# Patient Record
Sex: FEMALE | Race: BLACK OR AFRICAN AMERICAN | URBAN - METROPOLITAN AREA
[De-identification: names, ages, dates, MRNs, and addresses within clinical notes are randomized per-mention and may not be internally consistent; named-entity substitution may affect disease eponyms.]

---

## 2023-11-10 ENCOUNTER — HOSPITAL ENCOUNTER (EMERGENCY)
Age: 28
Discharge: HOME | End: 2023-11-10
Attending: EMERGENCY MEDICINE
Payer: MEDICAID

## 2023-11-10 VITALS
OXYGEN SATURATION: 100 % | HEART RATE: 94 BPM | HEIGHT: 62 IN | RESPIRATION RATE: 16 BRPM | DIASTOLIC BLOOD PRESSURE: 81 MMHG | SYSTOLIC BLOOD PRESSURE: 116 MMHG | BODY MASS INDEX: 22.31 KG/M2 | TEMPERATURE: 98.1 F | WEIGHT: 121.25 LBS

## 2023-11-10 DIAGNOSIS — R23.2 HOT FLASHES: Primary | ICD-10-CM

## 2023-11-10 DIAGNOSIS — D50.9 MICROCYTIC ANEMIA: ICD-10-CM

## 2023-11-10 LAB
BASOPHILS # BLD: 0 K/UL (ref 0–0.2)
BASOPHILS NFR BLD: 0.4 %
BILIRUB UR QL STRIP.AUTO: NEGATIVE
CLARITY UR: CLEAR
COLOR UR: YELLOW
DEPRECATED RDW RBC AUTO: 18.4 % (ref 12.4–15.4)
EOSINOPHIL # BLD: 0.1 K/UL (ref 0–0.6)
EOSINOPHIL NFR BLD: 1.3 %
GLUCOSE UR STRIP.AUTO-MCNC: NEGATIVE MG/DL
HCG UR QL: NEGATIVE
HCT VFR BLD AUTO: 27.9 % (ref 36–48)
HGB BLD-MCNC: 9.2 G/DL (ref 12–16)
HGB UR QL STRIP.AUTO: NEGATIVE
KETONES UR STRIP.AUTO-MCNC: NEGATIVE MG/DL
LEUKOCYTE ESTERASE UR QL STRIP.AUTO: NEGATIVE
LYMPHOCYTES # BLD: 3.4 K/UL (ref 1–5.1)
LYMPHOCYTES NFR BLD: 56 %
MCH RBC QN AUTO: 24.2 PG (ref 26–34)
MCHC RBC AUTO-ENTMCNC: 33.1 G/DL (ref 31–36)
MCV RBC AUTO: 73.1 FL (ref 80–100)
MONOCYTES # BLD: 0.4 K/UL (ref 0–1.3)
MONOCYTES NFR BLD: 7.3 %
NEUTROPHILS # BLD: 2.1 K/UL (ref 1.7–7.7)
NEUTROPHILS NFR BLD: 35 %
NITRITE UR QL STRIP.AUTO: NEGATIVE
PH UR STRIP.AUTO: 6 [PH] (ref 5–8)
PLATELET # BLD AUTO: 383 K/UL (ref 135–450)
PMV BLD AUTO: 6.9 FL (ref 5–10.5)
PROT UR STRIP.AUTO-MCNC: NEGATIVE MG/DL
RBC # BLD AUTO: 3.82 M/UL (ref 4–5.2)
SP GR UR STRIP.AUTO: >=1.03 (ref 1–1.03)
T4 FREE SERPL-MCNC: 0.8 NG/DL (ref 0.9–1.8)
TSH SERPL DL<=0.005 MIU/L-ACNC: 6.94 UIU/ML (ref 0.27–4.2)
UA DIPSTICK W REFLEX MICRO PNL UR: NORMAL
URN SPEC COLLECT METH UR: NORMAL
UROBILINOGEN UR STRIP-ACNC: 0.2 E.U./DL
WBC # BLD AUTO: 6.1 K/UL (ref 4–11)

## 2023-11-10 PROCEDURE — 84703 CHORIONIC GONADOTROPIN ASSAY: CPT

## 2023-11-10 PROCEDURE — 99283 EMERGENCY DEPT VISIT LOW MDM: CPT

## 2023-11-10 PROCEDURE — 36415 COLL VENOUS BLD VENIPUNCTURE: CPT

## 2023-11-10 PROCEDURE — 85025 COMPLETE CBC W/AUTO DIFF WBC: CPT

## 2023-11-10 PROCEDURE — 81003 URINALYSIS AUTO W/O SCOPE: CPT

## 2023-11-10 PROCEDURE — 84443 ASSAY THYROID STIM HORMONE: CPT

## 2023-11-10 PROCEDURE — 84439 ASSAY OF FREE THYROXINE: CPT

## 2023-11-10 RX ORDER — PNV NO.95/FERROUS FUM/FOLIC AC 28MG-0.8MG
1 TABLET ORAL DAILY
Qty: 90 TABLET | Refills: 2 | Status: SHIPPED | OUTPATIENT
Start: 2023-11-10 | End: 2024-02-08

## 2023-11-10 ASSESSMENT — LIFESTYLE VARIABLES
HOW OFTEN DO YOU HAVE A DRINK CONTAINING ALCOHOL: NEVER
HOW MANY STANDARD DRINKS CONTAINING ALCOHOL DO YOU HAVE ON A TYPICAL DAY: PATIENT DOES NOT DRINK

## 2023-11-10 ASSESSMENT — ENCOUNTER SYMPTOMS
ABDOMINAL PAIN: 0
DIARRHEA: 0
RHINORRHEA: 0
NAUSEA: 0
SHORTNESS OF BREATH: 0
SORE THROAT: 0
COUGH: 0

## 2023-11-10 ASSESSMENT — PAIN SCALES - GENERAL: PAINLEVEL_OUTOF10: 0

## 2023-11-10 NOTE — DISCHARGE INSTRUCTIONS
You have been provided with a printed prescription. You should continue a prenatal vitamin while you are breastfeeding. Watch for the results of your thyroid testing labs in the [facility] application or you can review these at your follow up appointment with the endocrinologist.    Be sure to contact the clinic listed in your paperwork to arrange for a follow up visit. If you have any new or worsening issues after going home don't hesitate to return here for reevaluation at any time 24/7!

## 2023-11-10 NOTE — ED NOTES
Discharge and education instructions reviewed. Patient verbalized understanding, teach-back successful. Patient denied questions at this time. No acute distress noted. Patient instructed to follow-up as noted - return to emergency department if symptoms worsen. Patient verbalized understanding. Discharged per EDMD with discharge instructions.         Ginger Weaver RN  11/10/23 [id number]

## 2023-12-05 ENCOUNTER — HOSPITAL ENCOUNTER (EMERGENCY)
Age: 28
Discharge: HOME | End: 2023-12-05
Payer: COMMERCIAL

## 2023-12-05 LAB
ALBUMIN SERPL-MCNC: 4.4 G/DL (ref 3.4–5)
ALBUMIN/GLOB SERPL: 1.3 {RATIO} (ref 1.1–2.2)
ALP SERPL-CCNC: 95 U/L (ref 40–129)
ALT SERPL-CCNC: 8 U/L (ref 10–40)
ANION GAP SERPL CALCULATED.3IONS-SCNC: 9 MMOL/L (ref 3–16)
AST SERPL-CCNC: 17 U/L (ref 15–37)
BASOPHILS # BLD: 0 K/UL (ref 0–0.2)
BASOPHILS NFR BLD: 0.4 %
BILIRUB SERPL-MCNC: 0.5 MG/DL (ref 0–1)
BILIRUB UR QL STRIP.AUTO: NEGATIVE
BUN SERPL-MCNC: 11 MG/DL (ref 7–20)
CALCIUM SERPL-MCNC: 9.8 MG/DL (ref 8.3–10.6)
CHLORIDE SERPL-SCNC: 102 MMOL/L (ref 99–110)
CLARITY UR: CLEAR
CO2 SERPL-SCNC: 29 MMOL/L (ref 21–32)
COLOR UR: YELLOW
CREAT SERPL-MCNC: <0.5 MG/DL (ref 0.6–1.1)
DEPRECATED RDW RBC AUTO: 19 % (ref 12.4–15.4)
EOSINOPHIL # BLD: 0.1 K/UL (ref 0–0.6)
EOSINOPHIL NFR BLD: 0.9 %
GFR SERPLBLD CREATININE-BSD FMLA CKD-EPI: >60 ML/MIN/{1.73_M2}
GLUCOSE SERPL-MCNC: 101 MG/DL (ref 70–99)
GLUCOSE UR STRIP.AUTO-MCNC: NEGATIVE MG/DL
HCG UR QL: NEGATIVE
HCT VFR BLD AUTO: 31.2 % (ref 36–48)
HGB BLD-MCNC: 10.2 G/DL (ref 12–16)
HGB UR QL STRIP.AUTO: NEGATIVE
KETONES UR STRIP.AUTO-MCNC: ABNORMAL MG/DL
LEUKOCYTE ESTERASE UR QL STRIP.AUTO: NEGATIVE
LYMPHOCYTES # BLD: 3.1 K/UL (ref 1–5.1)
LYMPHOCYTES NFR BLD: 45.8 %
MCH RBC QN AUTO: 24.2 PG (ref 26–34)
MCHC RBC AUTO-ENTMCNC: 32.7 G/DL (ref 31–36)
MCV RBC AUTO: 74.1 FL (ref 80–100)
MONOCYTES # BLD: 0.5 K/UL (ref 0–1.3)
MONOCYTES NFR BLD: 7.9 %
NEUTROPHILS # BLD: 3.1 K/UL (ref 1.7–7.7)
NEUTROPHILS NFR BLD: 45 %
NITRITE UR QL STRIP.AUTO: NEGATIVE
PH UR STRIP.AUTO: 6 [PH] (ref 5–8)
PLATELET # BLD AUTO: 403 K/UL (ref 135–450)
PMV BLD AUTO: 7.1 FL (ref 5–10.5)
POTASSIUM SERPL-SCNC: 3.4 MMOL/L (ref 3.5–5.1)
PROT SERPL-MCNC: 7.9 G/DL (ref 6.4–8.2)
PROT UR STRIP.AUTO-MCNC: NEGATIVE MG/DL
RBC # BLD AUTO: 4.2 M/UL (ref 4–5.2)
SODIUM SERPL-SCNC: 140 MMOL/L (ref 136–145)
SP GR UR STRIP.AUTO: >=1.03 (ref 1–1.03)
UA COMPLETE W REFLEX CULTURE PNL UR: ABNORMAL
UA DIPSTICK W REFLEX MICRO PNL UR: ABNORMAL
URN SPEC COLLECT METH UR: ABNORMAL
UROBILINOGEN UR STRIP-ACNC: 0.2 E.U./DL
WBC # BLD AUTO: 6.8 K/UL (ref 4–11)

## 2023-12-05 PROCEDURE — 84703 CHORIONIC GONADOTROPIN ASSAY: CPT

## 2023-12-05 PROCEDURE — 80053 COMPREHEN METABOLIC PANEL: CPT

## 2023-12-05 PROCEDURE — 81003 URINALYSIS AUTO W/O SCOPE: CPT

## 2023-12-05 PROCEDURE — 85025 COMPLETE CBC W/AUTO DIFF WBC: CPT

## 2023-12-05 PROCEDURE — 36415 COLL VENOUS BLD VENIPUNCTURE: CPT

## 2023-12-18 NOTE — ED PROVIDER NOTES
no vitals filed for this visit. Medications - No data to display    Discharge Medication List as of 12/5/2023  6:47 AM          SEP-1 CORE MEASURE DATA  Exclusion criteria: the patient is NOT to be included for sepsis due to:  SIRS criteria are not met    Patient remained stable in the ED. patient's laboratory work was unremarkable remarkable. Her potassium was 3.4. Glucose was 101. Liver functions were relatively normal.  Her ALT was mildly low otherwise it was normal.  Hemoglobin was 10.2 and hematocrit was 31.2 which is stable for this patient. Her urinalysis was negative. Therefore, patient was discharged to follow-up with her gynecologist for hormone testing and refer back to her family doctor for further evaluation and treatment. She was instructed return if any problems and continue her present care. See paper chart for specific discharge instructions. Diagnostic considerations include but are not limited to: Influenza, Other viral illness, Meningitis, Group A strep, Airway Obstruction, Pneumonia, Hypoxemia, Dehydration, COVID-19, other. CBC-CBC was ordered to rule out anemia, infection, abnormal platelet count, polycythemia, abnormal Red cell pathology, or any other pathology that might be causing the patient's symptoms    CMP-CMP was ordered to rule out electrolyte abnormalities, liver dysfunction, kidney dysfunction, electrolyte imbalance, abnormal transaminases, or any other pathology that might be causing the patient's symptoms. Urine-urinalysis was ordered to rule out infection, renal failure, dehydration, pregnancy, proteinuria, bilirubinuria, or any other pathology that might be causing the patient's symptoms    The patient's blood pressure was not found to be elevated according to CMS/Medicare and the Affordable Care Act/ObamaCare criteria. See discharge instructions for specific medications, discharge information, and treatments.  They were verbally instructed to return to

## 2024-01-21 ENCOUNTER — HOSPITAL ENCOUNTER (EMERGENCY)
Age: 29
Discharge: HOME | End: 2024-01-22
Attending: EMERGENCY MEDICINE
Payer: COMMERCIAL

## 2024-01-21 DIAGNOSIS — E03.9 HYPOTHYROIDISM, UNSPECIFIED TYPE: Primary | ICD-10-CM

## 2024-01-21 DIAGNOSIS — Z91.148 NON COMPLIANCE W MEDICATION REGIMEN: ICD-10-CM

## 2024-01-21 LAB
GLUCOSE BLD-MCNC: 116 MG/DL (ref 70–99)
PERFORMED ON: ABNORMAL

## 2024-01-21 PROCEDURE — 99283 EMERGENCY DEPT VISIT LOW MDM: CPT

## 2024-01-21 ASSESSMENT — PAIN SCALES - GENERAL: PAINLEVEL_OUTOF10: 0

## 2024-01-21 ASSESSMENT — PAIN - FUNCTIONAL ASSESSMENT: PAIN_FUNCTIONAL_ASSESSMENT: 0-10

## 2024-01-22 VITALS
WEIGHT: 125.66 LBS | HEART RATE: 92 BPM | OXYGEN SATURATION: 100 % | HEIGHT: 62 IN | SYSTOLIC BLOOD PRESSURE: 119 MMHG | BODY MASS INDEX: 23.12 KG/M2 | DIASTOLIC BLOOD PRESSURE: 59 MMHG | RESPIRATION RATE: 16 BRPM | TEMPERATURE: 97.4 F

## 2024-01-22 LAB
BILIRUB UR QL STRIP.AUTO: NEGATIVE
CLARITY UR: CLEAR
COLOR UR: YELLOW
GLUCOSE UR STRIP.AUTO-MCNC: NEGATIVE MG/DL
HCG UR QL: NEGATIVE
HGB UR QL STRIP.AUTO: NEGATIVE
KETONES UR STRIP.AUTO-MCNC: NEGATIVE MG/DL
LEUKOCYTE ESTERASE UR QL STRIP.AUTO: NEGATIVE
NITRITE UR QL STRIP.AUTO: NEGATIVE
PH UR STRIP.AUTO: 6 [PH] (ref 5–8)
PROT UR STRIP.AUTO-MCNC: NEGATIVE MG/DL
SP GR UR STRIP.AUTO: 1.01 (ref 1–1.03)
UA COMPLETE W REFLEX CULTURE PNL UR: NORMAL
UA DIPSTICK W REFLEX MICRO PNL UR: NORMAL
URN SPEC COLLECT METH UR: NORMAL
UROBILINOGEN UR STRIP-ACNC: 0.2 E.U./DL

## 2024-01-22 PROCEDURE — 81003 URINALYSIS AUTO W/O SCOPE: CPT

## 2024-01-22 PROCEDURE — 84703 CHORIONIC GONADOTROPIN ASSAY: CPT

## 2024-01-22 NOTE — ED TRIAGE NOTES
Patient to ED via EMS for trouble concentrating  that has been intermittent since having her thyroid removed.  Patient is alert and oriented with GCS 15.  Patient complains of \"brain fog\" that get worse when she has not slept well or becomes overstimulated.  Patient denies any dizziness, speech is normal, no slurring or trouble forming words.  Patient has steady gait when transferring from cot to ED bed.  Patient denies any other complaints at this time, vital signs stable.

## 2024-01-23 NOTE — ED PROVIDER NOTES
[facility]  EMERGENCY DEPARTMENT ENCOUNTER        Pt Name: Genesis Carter  MRN: [id number]  Birthdate 1995  Date of evaluation: 1/21/2024  Provider: Joaquin Albrecht MD  PCP: No primary care provider on file.  Note Started: 4:44 AM EST 1/23/24    CHIEF COMPLAINT       Chief Complaint   Patient presents with    difficulty concentrating     Reports intermittent \"brain fog\" that gets worse when she has not slept well or becomes overstimulated.       HISTORY OF PRESENT ILLNESS: 1 or more Elements   History From: Patient        Genesis Carter is a 28 y.o. female who presents for evaluation of what she describes as a brain fog.  Patient reports that over the past few weeks she has been having difficulties concentrating.  She states she has not been sleeping well.  She states that when she feels overstimulated she has problems focusing.  She denies head injury numbness weakness headache changes in vision fevers neck pain neck stiffness or rash.  Patient is been seen multiple times in the emergency department with similar complaints.  She reports she has a history of hypothyroidism is not currently taking medications for this.  She was noted to have low thyroid levels during previous ED visits.  Has not followed with a primary care doctor.  Patient states that she was recently gave birth and that prior to becoming pregnant her thyroid levels and estrogen levels were normal but since she received oxytocin they have been abnormal.  She has not followed with her OB/GYN.     Nursing Notes were all reviewed and agreed with or any disagreements were addressed in the HPI.    REVIEW OF SYSTEMS :      Review of Systems    Positives and Pertinent negatives as per HPI.     SURGICAL HISTORY     Past Surgical History:   Procedure Laterality Date    APPENDECTOMY      TOTAL THYROIDECTOMY         CURRENTMEDICATIONS       Discharge Medication List as of 1/22/2024 12:21 AM        CONTINUE these medications

## 2024-04-15 ENCOUNTER — APPOINTMENT (OUTPATIENT)
Dept: GENERAL RADIOLOGY | Age: 29
End: 2024-04-15
Payer: COMMERCIAL

## 2024-04-15 ENCOUNTER — HOSPITAL ENCOUNTER (EMERGENCY)
Age: 29
Discharge: HOME | End: 2024-04-15
Attending: EMERGENCY MEDICINE
Payer: COMMERCIAL

## 2024-04-15 VITALS
OXYGEN SATURATION: 99 % | SYSTOLIC BLOOD PRESSURE: 119 MMHG | WEIGHT: 121.03 LBS | DIASTOLIC BLOOD PRESSURE: 58 MMHG | HEART RATE: 84 BPM | TEMPERATURE: 98.9 F | RESPIRATION RATE: 18 BRPM | BODY MASS INDEX: 22.14 KG/M2

## 2024-04-15 DIAGNOSIS — Z32.02 PREGNANCY TEST NEGATIVE: ICD-10-CM

## 2024-04-15 DIAGNOSIS — G89.29 CHRONIC PAIN OF LEFT HAND: Primary | ICD-10-CM

## 2024-04-15 DIAGNOSIS — S62.337S CLOSED DISPLACED FRACTURE OF NECK OF FIFTH METACARPAL BONE OF LEFT HAND, SEQUELA: ICD-10-CM

## 2024-04-15 DIAGNOSIS — M79.642 CHRONIC PAIN OF LEFT HAND: Primary | ICD-10-CM

## 2024-04-15 LAB — HCG UR QL: NEGATIVE

## 2024-04-15 PROCEDURE — 99284 EMERGENCY DEPT VISIT MOD MDM: CPT

## 2024-04-15 PROCEDURE — 73130 X-RAY EXAM OF HAND: CPT

## 2024-04-15 PROCEDURE — 84703 CHORIONIC GONADOTROPIN ASSAY: CPT

## 2024-04-15 RX ORDER — ACETAMINOPHEN 500 MG
1000 TABLET ORAL ONCE
Status: DISCONTINUED | OUTPATIENT
Start: 2024-04-15 | End: 2024-04-15 | Stop reason: HOSPADM

## 2024-04-15 RX ORDER — IBUPROFEN 200 MG
600 TABLET ORAL EVERY 8 HOURS PRN
Qty: 60 TABLET | Refills: 0 | Status: SHIPPED | OUTPATIENT
Start: 2024-04-15

## 2024-04-15 ASSESSMENT — PAIN - FUNCTIONAL ASSESSMENT: PAIN_FUNCTIONAL_ASSESSMENT: 0-10

## 2024-04-15 ASSESSMENT — PAIN DESCRIPTION - LOCATION: LOCATION: HAND

## 2024-04-15 ASSESSMENT — PAIN DESCRIPTION - DESCRIPTORS: DESCRIPTORS: ACHING

## 2024-04-15 ASSESSMENT — PAIN SCALES - GENERAL: PAINLEVEL_OUTOF10: 7

## 2024-04-15 ASSESSMENT — PAIN DESCRIPTION - ORIENTATION: ORIENTATION: LEFT

## 2024-04-15 NOTE — ED NOTES
Patient's hand wrapped with an ACE wrap for comfort. Discharge instructions and ortho follow up reviewed with patient. Patient discharged home by self.
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
## 2024-04-18 ENCOUNTER — OFFICE VISIT (OUTPATIENT)
Dept: ORTHOPEDIC SURGERY | Age: 29
End: 2024-04-18
Payer: COMMERCIAL

## 2024-04-18 VITALS — BODY MASS INDEX: 22.26 KG/M2 | WEIGHT: 121 LBS | HEIGHT: 62 IN

## 2024-04-18 DIAGNOSIS — S62.337P CLOSED DISPLACED FRACTURE OF NECK OF FIFTH METACARPAL BONE OF LEFT HAND WITH MALUNION, SUBSEQUENT ENCOUNTER: Primary | ICD-10-CM

## 2024-04-18 PROBLEM — S62.337A CLOSED DISPLACED FRACTURE OF NECK OF LEFT FIFTH METACARPAL BONE: Status: ACTIVE | Noted: 2024-04-18

## 2024-04-18 PROCEDURE — G8427 DOCREV CUR MEDS BY ELIG CLIN: HCPCS | Performed by: ORTHOPAEDIC SURGERY

## 2024-04-18 PROCEDURE — G8420 CALC BMI NORM PARAMETERS: HCPCS | Performed by: ORTHOPAEDIC SURGERY

## 2024-04-18 PROCEDURE — 99204 OFFICE O/P NEW MOD 45 MIN: CPT | Performed by: ORTHOPAEDIC SURGERY

## 2024-04-18 PROCEDURE — 1036F TOBACCO NON-USER: CPT | Performed by: ORTHOPAEDIC SURGERY
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
## 2024-10-09 ENCOUNTER — HOSPITAL ENCOUNTER (EMERGENCY)
Age: 29
Discharge: HOME | End: 2024-10-09
Attending: STUDENT IN AN ORGANIZED HEALTH CARE EDUCATION/TRAINING PROGRAM
Payer: COMMERCIAL

## 2024-10-09 VITALS
OXYGEN SATURATION: 96 % | TEMPERATURE: 97.9 F | DIASTOLIC BLOOD PRESSURE: 79 MMHG | SYSTOLIC BLOOD PRESSURE: 113 MMHG | HEART RATE: 99 BPM | BODY MASS INDEX: 22.27 KG/M2 | HEIGHT: 62 IN | RESPIRATION RATE: 16 BRPM | WEIGHT: 121.03 LBS

## 2024-10-09 DIAGNOSIS — Z32.01 POSITIVE PREGNANCY TEST: Primary | ICD-10-CM

## 2024-10-09 LAB
ABO + RH BLD: NORMAL
ANION GAP SERPL CALCULATED.3IONS-SCNC: 13 MMOL/L (ref 3–16)
B-HCG SERPL EIA 3RD IS-ACNC: 27.8 MIU/ML
BACTERIA URNS QL MICRO: ABNORMAL /HPF
BASOPHILS # BLD: 0 K/UL (ref 0–0.2)
BASOPHILS NFR BLD: 0.4 %
BILIRUB UR QL STRIP.AUTO: NEGATIVE
BUN SERPL-MCNC: 15 MG/DL (ref 7–20)
CALCIUM SERPL-MCNC: 10 MG/DL (ref 8.3–10.6)
CHLORIDE SERPL-SCNC: 104 MMOL/L (ref 99–110)
CLARITY UR: CLEAR
CO2 SERPL-SCNC: 20 MMOL/L (ref 21–32)
COLOR UR: YELLOW
CREAT SERPL-MCNC: 0.5 MG/DL (ref 0.6–1.1)
DEPRECATED RDW RBC AUTO: 22.5 % (ref 12.4–15.4)
EOSINOPHIL # BLD: 0 K/UL (ref 0–0.6)
EOSINOPHIL NFR BLD: 0.7 %
EPI CELLS #/AREA URNS AUTO: 6 /HPF (ref 0–5)
GFR SERPLBLD CREATININE-BSD FMLA CKD-EPI: >90 ML/MIN/{1.73_M2}
GLUCOSE SERPL-MCNC: 88 MG/DL (ref 70–99)
GLUCOSE UR STRIP.AUTO-MCNC: NEGATIVE MG/DL
HCG UR QL: POSITIVE
HCT VFR BLD AUTO: 31.2 % (ref 36–48)
HGB BLD-MCNC: 10.1 G/DL (ref 12–16)
HGB UR QL STRIP.AUTO: NEGATIVE
HYALINE CASTS #/AREA URNS AUTO: 0 /LPF (ref 0–8)
KETONES UR STRIP.AUTO-MCNC: NEGATIVE MG/DL
LEUKOCYTE ESTERASE UR QL STRIP.AUTO: ABNORMAL
LYMPHOCYTES # BLD: 1.8 K/UL (ref 1–5.1)
LYMPHOCYTES NFR BLD: 28 %
MCH RBC QN AUTO: 24.4 PG (ref 26–34)
MCHC RBC AUTO-ENTMCNC: 32.4 G/DL (ref 31–36)
MCV RBC AUTO: 75.5 FL (ref 80–100)
MONOCYTES # BLD: 0.5 K/UL (ref 0–1.3)
MONOCYTES NFR BLD: 7.6 %
NEUTROPHILS # BLD: 4 K/UL (ref 1.7–7.7)
NEUTROPHILS NFR BLD: 63.3 %
NITRITE UR QL STRIP.AUTO: NEGATIVE
PH UR STRIP.AUTO: 6 [PH] (ref 5–8)
PLATELET # BLD AUTO: 408 K/UL (ref 135–450)
PMV BLD AUTO: 6.8 FL (ref 5–10.5)
POTASSIUM SERPL-SCNC: 4 MMOL/L (ref 3.5–5.1)
PROT UR STRIP.AUTO-MCNC: ABNORMAL MG/DL
RBC # BLD AUTO: 4.14 M/UL (ref 4–5.2)
RBC CLUMPS #/AREA URNS AUTO: 1 /HPF (ref 0–4)
SODIUM SERPL-SCNC: 137 MMOL/L (ref 136–145)
SP GR UR STRIP.AUTO: 1.03 (ref 1–1.03)
UA COMPLETE W REFLEX CULTURE PNL UR: YES
UA DIPSTICK W REFLEX MICRO PNL UR: YES
URN SPEC COLLECT METH UR: ABNORMAL
UROBILINOGEN UR STRIP-ACNC: 0.2 E.U./DL
WBC # BLD AUTO: 6.4 K/UL (ref 4–11)
WBC #/AREA URNS AUTO: 17 /HPF (ref 0–5)

## 2024-10-09 PROCEDURE — 86901 BLOOD TYPING SEROLOGIC RH(D): CPT

## 2024-10-09 PROCEDURE — 80048 BASIC METABOLIC PNL TOTAL CA: CPT

## 2024-10-09 PROCEDURE — 99283 EMERGENCY DEPT VISIT LOW MDM: CPT

## 2024-10-09 PROCEDURE — 84703 CHORIONIC GONADOTROPIN ASSAY: CPT

## 2024-10-09 PROCEDURE — 84702 CHORIONIC GONADOTROPIN TEST: CPT

## 2024-10-09 PROCEDURE — 87086 URINE CULTURE/COLONY COUNT: CPT

## 2024-10-09 PROCEDURE — 85025 COMPLETE CBC W/AUTO DIFF WBC: CPT

## 2024-10-09 PROCEDURE — 86900 BLOOD TYPING SEROLOGIC ABO: CPT

## 2024-10-09 PROCEDURE — 81001 URINALYSIS AUTO W/SCOPE: CPT

## 2024-10-09 ASSESSMENT — PAIN SCALES - GENERAL
PAINLEVEL_OUTOF10: 0
PAINLEVEL_OUTOF10: 0

## 2024-10-09 ASSESSMENT — PAIN - FUNCTIONAL ASSESSMENT
PAIN_FUNCTIONAL_ASSESSMENT: 0-10
PAIN_FUNCTIONAL_ASSESSMENT: NONE - DENIES PAIN
PAIN_FUNCTIONAL_ASSESSMENT: 0-10

## 2024-10-09 NOTE — ED PROVIDER NOTES
EMERGENCY DEPARTMENT ENCOUNTER      CHIEF COMPLAINT    Pregnancy Test (Pt states at home pregnancy test was taken today. Pt states \"I saw a little bit of a line, but I wasn't sure, so I just decided to come to the hospital.\" Pt denies pain, reports intermittent nausea, no nausea currently. Denies bleeding/discharge. Pt states she is 2 days past the expected start of her period.)    DAMION Carter is a 29 y.o. female with past medical history significant for acquired hypothyroid who presents for possible positive home pregnancy test  Patient tells me that her period was supposed to start 2 days ago but has not yet she reported she had some abdominal pain/cramping yesterday but no other symptoms denies any abdominal pain denies any vaginal bleeding at this time   She took a home pregnancy test and thought maybe she saw a line  Otherwise asymptomatic  Came here today for pregnancy test  Reports G3, P2    PAST MEDICAL HISTORY    Past Medical History:   Diagnosis Date    Acquired hypothyroidism        SURGICAL HISTORY    Past Surgical History:   Procedure Laterality Date    APPENDECTOMY      TOTAL THYROIDECTOMY         CURRENT MEDICATIONS    Current Outpatient Rx   Medication Sig Dispense Refill    ferrous fumarate-vitamin c (GRACE-SEQUELS) 65-25 MG TBCR CR tablet Take 1 tablet by mouth daily Pt states she takes 28mg iron supplement daily. Supplement contains vitamins B and C. Pt states supplement has not been prescribed by a physician.      NONFORMULARY Organic Thyroid Support supplement (Patient not taking: Reported on 10/9/2024)         ALLERGIES    No Known Allergies    Family history reviewed and noncontributory other than:  Family History   Problem Relation Age of Onset    Cancer Maternal Grandmother        Social history reviewed and noncontributory other than:  Social History     Socioeconomic History    Marital status: Single     Spouse name: Not on file    Number of children: Not on file    Years of

## 2024-10-09 NOTE — DISCHARGE INSTRUCTIONS
Today you are seen here in emergency department and had a positive pregnancy test.  Is extremely important that you go directly to the [facility] emergency department where they are expecting you so you get your ultrasound.  Is extremely important you do this because at this time you have a pregnancy of unknown location.

## 2024-10-09 NOTE — ED TRIAGE NOTES
Pt states at home pregnancy test was taken today. Pt states \"I saw a little bit of a line, but I wasn't sure, so I just decided to come to the hospital.\" Pt denies pain, reports intermittent nausea, no nausea currently. Denies bleeding/discharge. Pt states she is 2 days past the expected start of her period.

## 2024-10-10 LAB — BACTERIA UR CULT: NORMAL

## 2024-10-11 ENCOUNTER — HOSPITAL ENCOUNTER (EMERGENCY)
Age: 29
Discharge: HOME | End: 2024-10-11
Attending: STUDENT IN AN ORGANIZED HEALTH CARE EDUCATION/TRAINING PROGRAM
Payer: COMMERCIAL

## 2024-10-11 VITALS
RESPIRATION RATE: 14 BRPM | TEMPERATURE: 98.3 F | OXYGEN SATURATION: 100 % | DIASTOLIC BLOOD PRESSURE: 61 MMHG | SYSTOLIC BLOOD PRESSURE: 98 MMHG | HEART RATE: 96 BPM

## 2024-10-11 DIAGNOSIS — O26.91 COMPLICATION OF PREGNANCY IN FIRST TRIMESTER: ICD-10-CM

## 2024-10-11 DIAGNOSIS — N89.8 VAGINAL DISCHARGE: ICD-10-CM

## 2024-10-11 DIAGNOSIS — A59.9 TRICHOMONAS INFECTION: Primary | ICD-10-CM

## 2024-10-11 LAB
BACTERIA GENITAL QL WET PREP: ABNORMAL
BILIRUB UR QL STRIP.AUTO: NEGATIVE
CLARITY UR: CLEAR
CLUE CELLS SPEC QL WET PREP: ABNORMAL
COLOR UR: YELLOW
EPI CELLS #/AREA URNS HPF: ABNORMAL /HPF (ref 0–5)
EPI CELLS SPEC QL WET PREP: ABNORMAL
GLUCOSE UR STRIP.AUTO-MCNC: NEGATIVE MG/DL
HCG UR QL: POSITIVE
HGB UR QL STRIP.AUTO: NEGATIVE
KETONES UR STRIP.AUTO-MCNC: NEGATIVE MG/DL
LEUKOCYTE ESTERASE UR QL STRIP.AUTO: ABNORMAL
NITRITE UR QL STRIP.AUTO: NEGATIVE
PH UR STRIP.AUTO: 6 [PH] (ref 5–8)
PROT UR STRIP.AUTO-MCNC: NEGATIVE MG/DL
RBC #/AREA URNS HPF: ABNORMAL /HPF (ref 0–4)
RBC SPEC QL WET PREP: ABNORMAL
SP GR UR STRIP.AUTO: 1.02 (ref 1–1.03)
SPECIMEN SOURCE FLD: ABNORMAL
T VAGINALIS GENITAL QL WET PREP: ABNORMAL
TRICHOMONAS #/AREA URNS HPF: PRESENT /HPF
UA COMPLETE W REFLEX CULTURE PNL UR: ABNORMAL
UA DIPSTICK W REFLEX MICRO PNL UR: YES
URN SPEC COLLECT METH UR: ABNORMAL
UROBILINOGEN UR STRIP-ACNC: 0.2 E.U./DL
WBC #/AREA URNS HPF: ABNORMAL /HPF (ref 0–5)
WBC SPEC QL WET PREP: ABNORMAL
YEAST GENITAL QL WET PREP: ABNORMAL

## 2024-10-11 PROCEDURE — 87210 SMEAR WET MOUNT SALINE/INK: CPT

## 2024-10-11 PROCEDURE — 84703 CHORIONIC GONADOTROPIN ASSAY: CPT

## 2024-10-11 PROCEDURE — 87491 CHLMYD TRACH DNA AMP PROBE: CPT

## 2024-10-11 PROCEDURE — 99283 EMERGENCY DEPT VISIT LOW MDM: CPT

## 2024-10-11 PROCEDURE — 87252 VIRUS INOCULATION TISSUE: CPT

## 2024-10-11 PROCEDURE — 87591 N.GONORRHOEAE DNA AMP PROB: CPT

## 2024-10-11 PROCEDURE — 81001 URINALYSIS AUTO W/SCOPE: CPT

## 2024-10-11 PROCEDURE — 87253 VIRUS INOCULATE TISSUE ADDL: CPT

## 2024-10-11 PROCEDURE — 6370000000 HC RX 637 (ALT 250 FOR IP): Performed by: STUDENT IN AN ORGANIZED HEALTH CARE EDUCATION/TRAINING PROGRAM

## 2024-10-11 RX ORDER — METRONIDAZOLE 500 MG/1
500 TABLET ORAL 2 TIMES DAILY
Qty: 14 TABLET | Refills: 0 | Status: SHIPPED | OUTPATIENT
Start: 2024-10-11 | End: 2024-10-11

## 2024-10-11 RX ORDER — METRONIDAZOLE 500 MG/1
2000 TABLET ORAL ONCE
Status: COMPLETED | OUTPATIENT
Start: 2024-10-11 | End: 2024-10-11

## 2024-10-11 RX ADMIN — METRONIDAZOLE 2000 MG: 500 TABLET ORAL at 21:01

## 2024-10-11 ASSESSMENT — PAIN - FUNCTIONAL ASSESSMENT: PAIN_FUNCTIONAL_ASSESSMENT: 0-10

## 2024-10-11 ASSESSMENT — ENCOUNTER SYMPTOMS
EYE REDNESS: 0
VOMITING: 0
CONSTIPATION: 0
COUGH: 0
NAUSEA: 0
SHORTNESS OF BREATH: 0
EYE PAIN: 0
DIARRHEA: 0
RHINORRHEA: 0
BACK PAIN: 0
ABDOMINAL PAIN: 0

## 2024-10-11 ASSESSMENT — PAIN DESCRIPTION - PAIN TYPE: TYPE: ACUTE PAIN

## 2024-10-11 ASSESSMENT — PAIN SCALES - GENERAL: PAINLEVEL_OUTOF10: 7

## 2024-10-11 ASSESSMENT — PAIN DESCRIPTION - ONSET: ONSET: GRADUAL

## 2024-10-11 ASSESSMENT — PAIN DESCRIPTION - LOCATION: LOCATION: VAGINA

## 2024-10-11 ASSESSMENT — PAIN DESCRIPTION - FREQUENCY: FREQUENCY: CONTINUOUS

## 2024-10-11 ASSESSMENT — PAIN DESCRIPTION - DESCRIPTORS: DESCRIPTORS: BURNING

## 2024-10-11 NOTE — ED TRIAGE NOTES
Patient presents to ED for vaginal itching, vaginal sores, yellow discharge, and burning while urinating. This has been going on for about 5 days.      Patient is about 4 weeks pregnant, LMP 9/7/24. Patient was here recently and was sent to [facility] for further testing. She was told at that time her HCG was too low. Patient denies vaginal bleeding. This would be patient's third pregnancy.

## 2024-10-11 NOTE — ED PROVIDER NOTES
[facility] EMERGENCY DEPARTMENT  EMERGENCY DEPARTMENT ENCOUNTER        Pt Name: Genesis Carter  MRN: [id number]  Birthdate 1995  Date of evaluation: 10/9/2024  Provider: Jone Mei DO  PCP: No primary care provider on file.  Note Started: 11:43 AM EDT 10/11/24    CHIEF COMPLAINT      Pregnancy Test    HISTORY OF PRESENT ILLNESS: 1 or more Elements     Chief Complaint   Patient presents with    Pregnancy Test     Pt states at home pregnancy test was taken today. Pt states \"I saw a little bit of a line, but I wasn't sure, so I just decided to come to the hospital.\" Pt denies pain, reports intermittent nausea, no nausea currently. Denies bleeding/discharge. Pt states she is 2 days past the expected start of her period.     History from : Patient  Limitations to history : None    Genesis Carter is a 29 y.o. female who presents to the emergency department secondary to concern for a pregnancy. States that she took an at home test and saw \"a little bit of a line\" so she wasn't sure and wanted to confirm pregnancy. She was seen at [facility] where she had a positive urine pregnancy test. She was then transferred to our facility for possible need of US to r/o ectopic. However, patient denies any pain or vaginal bleeding during my encounter.    Past medical history noted below. Aside from what is stated above denies any other symptoms or modifying factors.   reports that she has never smoked. She has never used smokeless tobacco. She reports that she does not drink alcohol and does not use drugs.  Nursing Notes were all reviewed and agreed with or any disagreements addressed in HPI/MDM.  REVIEW OF SYSTEMS :    Review of Systems   Constitutional:  Negative for chills and fever.   HENT:  Negative for congestion and rhinorrhea.    Eyes:  Negative for pain and redness.   Respiratory:  Negative for cough and shortness of breath.    Cardiovascular:  Negative for chest pain and leg
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
## 2024-10-12 NOTE — ED NOTES
All questions answered by provider.    Patient alert at time of d/c, no s/s distress. Reviewed AVS with patient including follow up care and appointments. Patient ambulated to exit without difficulty.

## 2024-10-12 NOTE — DISCHARGE INSTRUCTIONS
Please follow-up with OB/GYN.  Please follow-up with results of gonorrhea chlamydia testing online.  Please take antibiotics completion.  If you have worsening symptoms please come back to the emergency department.  If you are positive for gonorrhea we will contact you and you would need to come back to the emergency department for IM administration of antibiotics.  If you are positive for chlamydia we will send doxycycline to pharmacy.

## 2024-10-14 ENCOUNTER — HOSPITAL ENCOUNTER (EMERGENCY)
Age: 29
Discharge: HOME | End: 2024-10-14
Payer: COMMERCIAL

## 2024-10-14 VITALS
HEART RATE: 94 BPM | DIASTOLIC BLOOD PRESSURE: 67 MMHG | OXYGEN SATURATION: 100 % | WEIGHT: 122.36 LBS | SYSTOLIC BLOOD PRESSURE: 108 MMHG | HEIGHT: 62 IN | RESPIRATION RATE: 12 BRPM | TEMPERATURE: 97.8 F | BODY MASS INDEX: 22.52 KG/M2

## 2024-10-14 DIAGNOSIS — B96.89 BACTERIAL VAGINOSIS IN PREGNANCY: Primary | ICD-10-CM

## 2024-10-14 DIAGNOSIS — O09.31 NO PRENATAL CARE IN CURRENT PREGNANCY IN FIRST TRIMESTER: ICD-10-CM

## 2024-10-14 DIAGNOSIS — O23.599 BACTERIAL VAGINOSIS IN PREGNANCY: Primary | ICD-10-CM

## 2024-10-14 DIAGNOSIS — Z20.2 STD EXPOSURE: ICD-10-CM

## 2024-10-14 LAB
BACTERIA GENITAL QL WET PREP: ABNORMAL
BACTERIA URNS QL MICRO: ABNORMAL /HPF
BILIRUB UR QL STRIP.AUTO: NEGATIVE
C TRACH DNA CVX QL NAA+PROBE: NEGATIVE
CLARITY UR: CLEAR
CLUE CELLS SPEC QL WET PREP: ABNORMAL
COLOR UR: YELLOW
EPI CELLS #/AREA URNS HPF: ABNORMAL /HPF (ref 0–5)
EPI CELLS SPEC QL WET PREP: ABNORMAL
GLUCOSE UR STRIP.AUTO-MCNC: NEGATIVE MG/DL
HCG UR QL: POSITIVE
HGB UR QL STRIP.AUTO: NEGATIVE
KETONES UR STRIP.AUTO-MCNC: NEGATIVE MG/DL
LEUKOCYTE ESTERASE UR QL STRIP.AUTO: ABNORMAL
MUCOUS THREADS #/AREA URNS LPF: ABNORMAL /LPF
N GONORRHOEA DNA CERV MUCUS QL NAA+PROBE: POSITIVE
NITRITE UR QL STRIP.AUTO: NEGATIVE
PH UR STRIP.AUTO: 6.5 [PH] (ref 5–8)
PROT UR STRIP.AUTO-MCNC: NEGATIVE MG/DL
RBC #/AREA URNS HPF: ABNORMAL /HPF (ref 0–4)
RBC SPEC QL WET PREP: ABNORMAL
SP GR UR STRIP.AUTO: >=1.03 (ref 1–1.03)
SPECIMEN SOURCE FLD: ABNORMAL
T VAGINALIS GENITAL QL WET PREP: ABNORMAL
TRICHOMONAS #/AREA URNS HPF: ABNORMAL /HPF
UA COMPLETE W REFLEX CULTURE PNL UR: ABNORMAL
UA DIPSTICK W REFLEX MICRO PNL UR: YES
URN SPEC COLLECT METH UR: ABNORMAL
UROBILINOGEN UR STRIP-ACNC: 0.2 E.U./DL
WBC #/AREA URNS HPF: ABNORMAL /HPF (ref 0–5)
WBC SPEC QL WET PREP: ABNORMAL
YEAST GENITAL QL WET PREP: ABNORMAL

## 2024-10-14 PROCEDURE — 81001 URINALYSIS AUTO W/SCOPE: CPT

## 2024-10-14 PROCEDURE — 87210 SMEAR WET MOUNT SALINE/INK: CPT

## 2024-10-14 PROCEDURE — 87591 N.GONORRHOEAE DNA AMP PROB: CPT

## 2024-10-14 PROCEDURE — 96372 THER/PROPH/DIAG INJ SC/IM: CPT

## 2024-10-14 PROCEDURE — 87491 CHLMYD TRACH DNA AMP PROBE: CPT

## 2024-10-14 PROCEDURE — 6360000002 HC RX W HCPCS

## 2024-10-14 PROCEDURE — 99284 EMERGENCY DEPT VISIT MOD MDM: CPT

## 2024-10-14 PROCEDURE — 6370000000 HC RX 637 (ALT 250 FOR IP)

## 2024-10-14 PROCEDURE — 84703 CHORIONIC GONADOTROPIN ASSAY: CPT

## 2024-10-14 RX ORDER — METRONIDAZOLE 7.5 MG/G
1 GEL VAGINAL DAILY
Qty: 5 EACH | Refills: 0 | Status: SHIPPED | OUTPATIENT
Start: 2024-10-14 | End: 2024-10-19

## 2024-10-14 RX ORDER — AZITHROMYCIN 500 MG/1
2000 TABLET, FILM COATED ORAL ONCE
Status: COMPLETED | OUTPATIENT
Start: 2024-10-14 | End: 2024-10-14

## 2024-10-14 RX ORDER — CEFTRIAXONE 500 MG/1
500 INJECTION, POWDER, FOR SOLUTION INTRAMUSCULAR; INTRAVENOUS ONCE
Status: COMPLETED | OUTPATIENT
Start: 2024-10-14 | End: 2024-10-14

## 2024-10-14 RX ORDER — VITAMIN A ACETATE, BETA CAROTENE, ASCORBIC ACID, CHOLECALCIFEROL, .ALPHA.-TOCOPHEROL ACETATE, DL-, THIAMINE MONONITRATE, RIBOFLAVIN, NIACINAMIDE, PYRIDOXINE HYDROCHLORIDE, FOLIC ACID, CYANOCOBALAMIN, CALCIUM CARBONATE, FERROUS FUMARATE, ZINC OXIDE, CUPRIC OXIDE 3080; 12; 120; 400; 1; 1.84; 3; 20; 22; 920; 25; 200; 27; 10; 2 [IU]/1; UG/1; MG/1; [IU]/1; MG/1; MG/1; MG/1; MG/1; MG/1; [IU]/1; MG/1; MG/1; MG/1; MG/1; MG/1
1 TABLET, FILM COATED ORAL DAILY
Qty: 30 TABLET | Refills: 0 | Status: SHIPPED | OUTPATIENT
Start: 2024-10-14

## 2024-10-14 RX ADMIN — AZITHROMYCIN 2000 MG: 500 TABLET, FILM COATED ORAL at 16:22

## 2024-10-14 RX ADMIN — CEFTRIAXONE SODIUM 500 MG: 500 INJECTION, POWDER, FOR SOLUTION INTRAMUSCULAR; INTRAVENOUS at 16:22

## 2024-10-14 SDOH — ECONOMIC STABILITY: FOOD INSECURITY: WITHIN THE PAST 12 MONTHS, THE FOOD YOU BOUGHT JUST DIDN'T LAST AND YOU DIDN'T HAVE MONEY TO GET MORE.: NEVER TRUE

## 2024-10-14 SDOH — ECONOMIC STABILITY: FOOD INSECURITY: WITHIN THE PAST 12 MONTHS, YOU WORRIED THAT YOUR FOOD WOULD RUN OUT BEFORE YOU GOT MONEY TO BUY MORE.: NEVER TRUE

## 2024-10-14 SDOH — ECONOMIC STABILITY: INCOME INSECURITY: IN THE LAST 12 MONTHS, WAS THERE A TIME WHEN YOU WERE NOT ABLE TO PAY THE MORTGAGE OR RENT ON TIME?: NO

## 2024-10-14 ASSESSMENT — PAIN - FUNCTIONAL ASSESSMENT: PAIN_FUNCTIONAL_ASSESSMENT: NONE - DENIES PAIN

## 2024-10-14 ASSESSMENT — LIFESTYLE VARIABLES
HOW MANY STANDARD DRINKS CONTAINING ALCOHOL DO YOU HAVE ON A TYPICAL DAY: PATIENT DOES NOT DRINK
HOW OFTEN DO YOU HAVE A DRINK CONTAINING ALCOHOL: NEVER

## 2024-10-14 ASSESSMENT — SOCIAL DETERMINANTS OF HEALTH (SDOH): HOW HARD IS IT FOR YOU TO PAY FOR THE VERY BASICS LIKE FOOD, HOUSING, MEDICAL CARE, AND HEATING?: NOT HARD AT ALL

## 2024-10-14 NOTE — RESULT ENCOUNTER NOTE
Culture reviewed, please contact patient and inform them of the results and have the patient return to the emergency department for IM Rocephin for treatment of gonorrhea.

## 2024-10-14 NOTE — ED NOTES
Patient DCed from ED at this time. Discussed AVS, follow up, and scripts. They verbalized understanding. Reinforced that should symptoms persist or worsen to return to the ED. They verbalized understanding. Patient ambulated out of ED. RN thanked patient for choosing [facility].

## 2024-10-14 NOTE — DISCHARGE INSTRUCTIONS
You were treated today for BV,  Gonorrhea and chlamydia.     You should contact all of your current and recent sexual partners so that they can get tested and treated.    You should abstain from sex for at least 2 weeks or until all symptoms have resolved so as not to infect others.     It is very important that you follow up with your regular doctor or an STI clinic for further follow up, reevaluation, and testing (HIV, Hepatitis, syphilis, etc) in the future.   You may also follow up with the [facility] for STI testing, call [phone] for an appointment.    Use condoms with all sexual partners!

## 2024-10-14 NOTE — ED PROVIDER NOTES
discussed)  None      Records Reviewed (External and Source)   Prior ed visit notes in med red    [facility] Summary, DDx, ED Course, and Reassessment:   29-year-old presents as in HPI.  She has a positive pregnancy test.  She tells me she has come from an outpatient ultrasound facility, cannot tell me which one where she has confirmed IUP.  She denies any abdominal pain or bleeding.  She tells me she recently tested positive for chlamydia but was not treated.    Workup here shows no obvious UTI.  Contaminated catch.  Her wet prep is positive for clue cells which we will treat topically.  We will also treat her for gonorrhea and chlamydia based on her past history and pregnancy.  Discussed need for very close follow-up with a gynecologist.  She has had no prenatal care in this pregnancy as such we will start her on a prenatal.  List of medications safe in pregnancy also provided.    Although hospitalization was considered given age, medical co morbidities and chief complaint; reassuring workup and symptom improvement on reassessment prior to discharge supports decision for further outpatient management/evaluation/treatment.      The plan is to discharge to home.  Patient is in agreement with plan and questions have been answered.      I discussed with patient the results of evaluation in the ED, diagnosis, care, and prognosis.  I also discussed with patient the reasons which may require a return visit and the importance of follow-up care.  The patient is well-appearing, nontoxic, and improved at the time of discharge.  Patient agrees to call to arrange follow-up care as directed.   Patient understands to return immediately for worsening/change in symptoms.       Disposition Considerations (tests considered but not done, Admit vs D/C, Shared Decision Making, Pt Expectation of Test or Tx.): anbove     The patient tolerated their visit well.  The patient and / or the family were informed of the results of any tests, a time

## 2024-10-14 NOTE — RESULT ENCOUNTER NOTE
Patient's positive result has been appropriately evaluated by the provider pool.   Patient saw results in My Chart and went to [facility] for treatment!      Pharmacy:   [id number] [place], [place] - [address] - P [phone] - F [phone] [address] [facility] [place] [address]  Phone: [phone] Fax: [phone]    Phone number:   Pharmacist receiving the prescription:

## 2024-10-15 LAB
C TRACH DNA CVX QL NAA+PROBE: NEGATIVE
FINAL REPORT: NORMAL
N GONORRHOEA DNA CERV MUCUS QL NAA+PROBE: POSITIVE
PRELIMINARY: NORMAL

## 2024-10-15 NOTE — RESULT ENCOUNTER NOTE
Patient's positive result has been appropriately evaluated by the provider pool.   Patient was contacted and notified of the results.        Pharmacy:   [facility] #[id number] [place], [place] - [address] - P [phone] - F [phone]  Critical access hospital[address] [facility] [address]  Phone: [phone] Fax: [phone]    Phone number:   Pharmacist receiving the prescription:

## 2024-10-15 NOTE — RESULT ENCOUNTER NOTE
Culture reviewed, please contact patient and inform them of the results and prescribe Valacyclovir 500 mg orally 2 times/day for 3 days and have patient notify sexual partners.

## 2024-10-15 NOTE — RESULT ENCOUNTER NOTE
Patient's positive result has been appropriately evaluated by the provider pool.   Patient was contacted and notified of the results and doesn't want the Valacyclovir called in to a pharmacy at this time, she will use natural remedy.        Pharmacy:   [id number] [place], [place] - [address]  P [phone] - F [phone]  [facility] [facility] [address]  Phone: [phone] Fax: [phone]    Phone number:   Pharmacist receiving the prescription:

## 2024-10-15 NOTE — RESULT ENCOUNTER NOTE
Culture reviewed, This patient was positive for gonorrhea and was already treated when in the ED.  Please provide the patient with a courtesy notification of their positive STD results.

## 2025-06-13 ENCOUNTER — APPOINTMENT (OUTPATIENT)
Dept: CT IMAGING | Age: 30
End: 2025-06-13
Payer: COMMERCIAL

## 2025-06-13 ENCOUNTER — HOSPITAL ENCOUNTER (EMERGENCY)
Age: 30
Discharge: HOME | End: 2025-06-13
Payer: COMMERCIAL

## 2025-06-13 VITALS
DIASTOLIC BLOOD PRESSURE: 74 MMHG | OXYGEN SATURATION: 100 % | TEMPERATURE: 98.8 F | RESPIRATION RATE: 16 BRPM | WEIGHT: 139.99 LBS | HEIGHT: 62 IN | HEART RATE: 80 BPM | SYSTOLIC BLOOD PRESSURE: 110 MMHG | BODY MASS INDEX: 25.76 KG/M2

## 2025-06-13 DIAGNOSIS — R41.89 BRAIN FOG: Primary | ICD-10-CM

## 2025-06-13 DIAGNOSIS — R23.2 HOT FLASHES: ICD-10-CM

## 2025-06-13 LAB
ANION GAP SERPL CALCULATED.3IONS-SCNC: 12 MMOL/L (ref 3–16)
B-HCG SERPL EIA 3RD IS-ACNC: <5 MIU/ML
BASOPHILS # BLD: 0 K/UL (ref 0–0.2)
BASOPHILS NFR BLD: 0.3 %
BILIRUB UR QL STRIP.AUTO: NEGATIVE
BUN SERPL-MCNC: 10 MG/DL (ref 7–20)
CALCIUM SERPL-MCNC: 9.6 MG/DL (ref 8.3–10.6)
CHLORIDE SERPL-SCNC: 103 MMOL/L (ref 99–110)
CLARITY UR: CLEAR
CO2 SERPL-SCNC: 26 MMOL/L (ref 21–32)
COLOR UR: YELLOW
CREAT SERPL-MCNC: 0.5 MG/DL (ref 0.6–1.1)
DEPRECATED RDW RBC AUTO: 18.6 % (ref 12.4–15.4)
EOSINOPHIL # BLD: 0.1 K/UL (ref 0–0.6)
EOSINOPHIL NFR BLD: 1 %
GFR SERPLBLD CREATININE-BSD FMLA CKD-EPI: >90 ML/MIN/{1.73_M2}
GLUCOSE SERPL-MCNC: 77 MG/DL (ref 70–99)
GLUCOSE UR STRIP.AUTO-MCNC: NEGATIVE MG/DL
HCT VFR BLD AUTO: 27.9 % (ref 36–48)
HGB BLD-MCNC: 9 G/DL (ref 12–16)
HGB UR QL STRIP.AUTO: NEGATIVE
KETONES UR STRIP.AUTO-MCNC: NEGATIVE MG/DL
LEUKOCYTE ESTERASE UR QL STRIP.AUTO: NEGATIVE
LYMPHOCYTES # BLD: 2 K/UL (ref 1–5.1)
LYMPHOCYTES NFR BLD: 30.7 %
MAGNESIUM SERPL-MCNC: 1.97 MG/DL (ref 1.8–2.4)
MCH RBC QN AUTO: 23 PG (ref 26–34)
MCHC RBC AUTO-ENTMCNC: 32.2 G/DL (ref 31–36)
MCV RBC AUTO: 71.2 FL (ref 80–100)
MONOCYTES # BLD: 0.6 K/UL (ref 0–1.3)
MONOCYTES NFR BLD: 8.7 %
NEUTROPHILS # BLD: 3.8 K/UL (ref 1.7–7.7)
NEUTROPHILS NFR BLD: 59.3 %
NITRITE UR QL STRIP.AUTO: NEGATIVE
PH UR STRIP.AUTO: 7 [PH] (ref 5–8)
PLATELET # BLD AUTO: 369 K/UL (ref 135–450)
PMV BLD AUTO: 6.8 FL (ref 5–10.5)
POTASSIUM SERPL-SCNC: 3.5 MMOL/L (ref 3.5–5.1)
PROT UR STRIP.AUTO-MCNC: NEGATIVE MG/DL
RBC # BLD AUTO: 3.91 M/UL (ref 4–5.2)
SODIUM SERPL-SCNC: 141 MMOL/L (ref 136–145)
SP GR UR STRIP.AUTO: 1.01 (ref 1–1.03)
TSH SERPL DL<=0.005 MIU/L-ACNC: 3.28 UIU/ML (ref 0.27–4.2)
UA COMPLETE W REFLEX CULTURE PNL UR: NORMAL
UA DIPSTICK W REFLEX MICRO PNL UR: NORMAL
URN SPEC COLLECT METH UR: NORMAL
UROBILINOGEN UR STRIP-ACNC: 1 E.U./DL
WBC # BLD AUTO: 6.4 K/UL (ref 4–11)

## 2025-06-13 PROCEDURE — 81003 URINALYSIS AUTO W/O SCOPE: CPT

## 2025-06-13 PROCEDURE — 84443 ASSAY THYROID STIM HORMONE: CPT

## 2025-06-13 PROCEDURE — 70450 CT HEAD/BRAIN W/O DYE: CPT

## 2025-06-13 PROCEDURE — 99284 EMERGENCY DEPT VISIT MOD MDM: CPT

## 2025-06-13 PROCEDURE — 36415 COLL VENOUS BLD VENIPUNCTURE: CPT

## 2025-06-13 PROCEDURE — 85025 COMPLETE CBC W/AUTO DIFF WBC: CPT

## 2025-06-13 PROCEDURE — 83735 ASSAY OF MAGNESIUM: CPT

## 2025-06-13 PROCEDURE — 80048 BASIC METABOLIC PNL TOTAL CA: CPT

## 2025-06-13 PROCEDURE — 84702 CHORIONIC GONADOTROPIN TEST: CPT

## 2025-06-13 ASSESSMENT — PAIN - FUNCTIONAL ASSESSMENT
PAIN_FUNCTIONAL_ASSESSMENT: 0-10
PAIN_FUNCTIONAL_ASSESSMENT: 0-10

## 2025-06-13 ASSESSMENT — ENCOUNTER SYMPTOMS
COUGH: 0
VOMITING: 0
NAUSEA: 1
ANAL BLEEDING: 0
EYE PAIN: 0
SHORTNESS OF BREATH: 0
ABDOMINAL PAIN: 0
SORE THROAT: 0
BACK PAIN: 0

## 2025-06-13 ASSESSMENT — PAIN SCALES - GENERAL
PAINLEVEL_OUTOF10: 0
PAINLEVEL_OUTOF10: 0

## 2025-06-13 NOTE — ED PROVIDER NOTES
considered but less likely based on history and physical.  Considered status epilepticus, breakthrough seizure, intracranial bleed, brain tumor, hypoglycemia, neck fracture or other orthopedic fractures, posterior shoulder dislocation, tongue laceration, atypical migraine, other.          Genesis Carter is a 29 y.o. nontoxic, well-appearing female with a medical history including, limited to, status post thyroidectomy at age 15, who presents to the emergency department for evaluation of a 14-year history of intermittent \"hot flashes but only in my brain\" and feeling as if she is experiencing a precursor to seizures.  She has never had a seizure before.  However she states that her friend has a prodrome of similar presentation of \"brain hot flash\" prior to her seizures.  Patient endorses that she has brain fog associated with the episodes accompanied by nausea and lightheadedness.  She states that this is what occurred earlier today prior to arrival.  She states that the duration of the symptoms are generally 20-30 minutes with an approximately 30-minute period where she is not able to think clearly, activities such as driving or other activities that require strict attention as if she does attempt to perform these activities will call us recurrence of the \"brain hot flash\" which she describes as confusion.  I will obtain CBC, BMP, magnesium, TSH with reflex to FT4, urine reflex to culture, hCG quantitative, and CT head without contrast.  Workup pending.  Patient medicated as below.          NIH=0  Negative test of skew  No difficulty performing finger-nose  No limb ataxia  No truncal instability   Patient is ambulatory in the emergency department    Is this patient to be included in the SEP-1 Core Measure due to severe sepsis or septic shock?   No   Exclusion criteria - the patient is NOT to be included for SEP-1 Core Measure due to:  Infection is not suspected    Patient was given the following  medications:  Medications - No data to display        Workup reveals:    CBC: No leukocytosis, chronic anemia with RBC reduced at 8.91, H&H reduced at 9.0 and 27.9 respectively with previous values consistent, MCV reduced at 71.2, MCH 23.0 RDW elevated at 18.6 otherwise unremarkable  Metabolic panel: No electrolyte derangement or renal dysfunction, unremarkable  Urine reflex to culture: Unremarkable and inconsistent UTI  TSH reflex to FT4: Pending  hCG quant: Negative pregnancy with a value of <5.0  Magnesium: WNL@1.97            ED Course as of 06/13/25 1712 Fri Jun 13, 2025 1705 Patient states she needs to leave in order to  her children.  Considered having the patient AMA but given the relatively benign presentation of prolonged duration did not have our AMA but she will check her MyChart for CT results and follow-up as an outpatient with PCP and neurology. [LL]      ED Course User Index  [LL] Oneyda Nelson, APRN - CNP      CT head without contrast: As noted above within defined no acute intracranial abnormality        Chronic Conditions affecting care: None  Ms. Carter has a past medical history of Acquired hypothyroidism.    CONSULTS: (Who and What was discussed)  None      Social Determinants Significantly Affecting Health : None    Records Reviewed (External, Source and Summary) none    CC/HPI Summary, DDx, ED Course, and Reassessment: Patient presents to the emergency department for evaluation of.  She feels is prodromal symptoms prior to seizures.  She states she has had issues with \"hot flashes\" in her brain since she was age 15.  She reports no seizure today or in the past but feels a brain fog and has a period similar to a postictal state after these episodes.  Patient had reassuring serological workup here in the emergency department with chronic anemia, no leukocytosis, normal pregnancy magnesium, TSH reflex to FT4 and CT head hear are in process at the time patient request discharge as

## 2025-06-13 NOTE — DISCHARGE INSTRUCTIONS
Return to the emergency department for new or worsening symptoms including, not limited to, developing an actual seizure, severe headache not relieved by OTC ibuprofen and Tylenol, other symptoms/concerns.      Follow-up with the [facility] residency clinic in order to establish a PCP for your future nonemergent medical needs.      On Monday please call [place] [facility] in order to establish a neurologist for evaluation of seizures.

## 2025-06-13 NOTE — ED NOTES
Dc'd to home  She is in a hurry to leave to  children  Skin warm and dry  Resp easy and unlabored  Aware to return for worsening  to follow up with neuro and pmd to get recheck  Aware she needs to continue to monitor her blood count because it is low

## 2025-07-18 ENCOUNTER — HOSPITAL ENCOUNTER (EMERGENCY)
Age: 30
Discharge: HOME | End: 2025-07-18
Attending: STUDENT IN AN ORGANIZED HEALTH CARE EDUCATION/TRAINING PROGRAM
Payer: COMMERCIAL

## 2025-07-18 VITALS
OXYGEN SATURATION: 100 % | RESPIRATION RATE: 18 BRPM | SYSTOLIC BLOOD PRESSURE: 104 MMHG | DIASTOLIC BLOOD PRESSURE: 66 MMHG | BODY MASS INDEX: 25.76 KG/M2 | TEMPERATURE: 98.6 F | HEIGHT: 62 IN | HEART RATE: 84 BPM | WEIGHT: 140 LBS

## 2025-07-18 DIAGNOSIS — Z32.02 PREGNANCY EXAMINATION OR TEST, NEGATIVE RESULT: Primary | ICD-10-CM

## 2025-07-18 LAB
BILIRUB UR QL STRIP.AUTO: NEGATIVE
CLARITY UR: CLEAR
COLOR UR: YELLOW
GLUCOSE UR STRIP.AUTO-MCNC: NEGATIVE MG/DL
HCG UR QL: NEGATIVE
HGB UR QL STRIP.AUTO: NEGATIVE
KETONES UR STRIP.AUTO-MCNC: NEGATIVE MG/DL
LEUKOCYTE ESTERASE UR QL STRIP.AUTO: NEGATIVE
NITRITE UR QL STRIP.AUTO: NEGATIVE
PH UR STRIP.AUTO: 7.5 [PH] (ref 5–8)
PROT UR STRIP.AUTO-MCNC: NEGATIVE MG/DL
SP GR UR STRIP.AUTO: 1.01 (ref 1–1.03)
UA COMPLETE W REFLEX CULTURE PNL UR: NORMAL
UA DIPSTICK W REFLEX MICRO PNL UR: NORMAL
URN SPEC COLLECT METH UR: NORMAL
UROBILINOGEN UR STRIP-ACNC: 0.2 E.U./DL

## 2025-07-18 PROCEDURE — 81003 URINALYSIS AUTO W/O SCOPE: CPT

## 2025-07-18 PROCEDURE — 99283 EMERGENCY DEPT VISIT LOW MDM: CPT

## 2025-07-18 PROCEDURE — 84703 CHORIONIC GONADOTROPIN ASSAY: CPT

## 2025-07-18 ASSESSMENT — PAIN - FUNCTIONAL ASSESSMENT: PAIN_FUNCTIONAL_ASSESSMENT: NONE - DENIES PAIN

## 2025-07-18 NOTE — DISCHARGE INSTRUCTIONS
You are seen today in the emergency department for pregnancy test.  Your urine pregnancy test was negative.  It is recommended that you follow[facility] with your [facility] for continued management.  Please follow-up with your regular doctor as needed.  Please return to the emergency department if you experience further concerning symptoms.

## 2025-07-18 NOTE — ED PROVIDER NOTES
[facility] EMERGENCY DEPARTMENT      EMERGENCY MEDICINE     Pt Name: Genesis Carter  MRN: [id number]  Birthdate 1995  Date of evaluation: 7/18/2025  Provider: Cj So DO    CHIEF COMPLAINT       Chief Complaint   Patient presents with    Pregnancy Test     Positive pregnancy test at home. LMP 6/23.  Denies pain, denies vaginal bleeding.      HISTORY OF PRESENT ILLNESS   Genesis Carter is a 29 y.o. female who presents to the emergency department for pregnancy test.  Patient states she had a positive pregnancy test at home that was faintly positive approximately 30 minutes ago.  Is requesting confirmatory testing.  Follows with [employer].  States that her last menstrual period was 23 June.  She is not having vaginal discharge vaginal bleeding abdominal pain or dysuria.  No nausea or vomiting.  No other complaints at this time.        PASTMEDICAL HISTORY     Past Medical History:   Diagnosis Date    Acquired hypothyroidism        Patient Active Problem List   Diagnosis Code    Closed displaced fracture of neck of left fifth metacarpal bone S62.337A     SURGICAL HISTORY       Past Surgical History:   Procedure Laterality Date    APPENDECTOMY      TOTAL THYROIDECTOMY         CURRENT MEDICATIONS       Discharge Medication List as of 7/18/2025 12:35 PM          ALLERGIES     has no known allergies.    FAMILY HISTORY     She indicated that the status of her maternal grandmother is unknown.       SOCIAL HISTORY       Social History     Tobacco Use    Smoking status: Never    Smokeless tobacco: Never   Substance Use Topics    Alcohol use: No    Drug use: No       PHYSICAL EXAM       ED Triage Vitals [07/18/25 1200]   BP Systolic BP Percentile Diastolic BP Percentile Temp Temp Source Pulse Respirations SpO2   104/66 -- -- 98.6 °F (37 °C) Oral 84 18 100 %      Height Weight - Scale         1.575 m (5' 2\") 63.5 kg (140 lb)               Physical Exam  Constitutional:       General: She is not in acute

## 2025-07-18 NOTE — ED NOTES
Pt discharged from ED to home. Pt verbalizes understanding to discharge instructions, teach back successful. Pt denies questions at this time. No acute distress noted. Resp even and unlabored. A/ox4. Pt instructed to follow-up as noted - return to ED if symptoms worsen. Pt verbalizes understanding. Discharged per ED MD with discharge instructions.

## 2025-07-22 ENCOUNTER — APPOINTMENT (OUTPATIENT)
Dept: ULTRASOUND IMAGING | Age: 30
End: 2025-07-22
Payer: COMMERCIAL

## 2025-07-22 ENCOUNTER — HOSPITAL ENCOUNTER (EMERGENCY)
Age: 30
Discharge: HOME | End: 2025-07-22
Attending: EMERGENCY MEDICINE
Payer: COMMERCIAL

## 2025-07-22 VITALS
HEIGHT: 62 IN | BODY MASS INDEX: 25.84 KG/M2 | OXYGEN SATURATION: 99 % | DIASTOLIC BLOOD PRESSURE: 58 MMHG | SYSTOLIC BLOOD PRESSURE: 113 MMHG | RESPIRATION RATE: 16 BRPM | TEMPERATURE: 98.3 F | WEIGHT: 140.43 LBS | HEART RATE: 82 BPM

## 2025-07-22 DIAGNOSIS — R10.9 ABDOMINAL PAIN IN PREGNANCY, FIRST TRIMESTER: Primary | ICD-10-CM

## 2025-07-22 DIAGNOSIS — R79.89 ELEVATED SERUM HCG: ICD-10-CM

## 2025-07-22 DIAGNOSIS — O26.891 ABDOMINAL PAIN IN PREGNANCY, FIRST TRIMESTER: Primary | ICD-10-CM

## 2025-07-22 LAB
B-HCG SERPL EIA 3RD IS-ACNC: 261 MIU/ML
BACTERIA GENITAL QL WET PREP: NORMAL
BILIRUB UR QL STRIP.AUTO: NEGATIVE
CLARITY UR: CLEAR
CLUE CELLS SPEC QL WET PREP: NORMAL
COLOR UR: YELLOW
EPI CELLS SPEC QL WET PREP: NORMAL
GLUCOSE UR STRIP.AUTO-MCNC: NEGATIVE MG/DL
HCG UR QL: POSITIVE
HGB UR QL STRIP.AUTO: NEGATIVE
KETONES UR STRIP.AUTO-MCNC: NEGATIVE MG/DL
LEUKOCYTE ESTERASE UR QL STRIP.AUTO: NEGATIVE
NITRITE UR QL STRIP.AUTO: NEGATIVE
PH UR STRIP.AUTO: 7.5 [PH] (ref 5–8)
PROT UR STRIP.AUTO-MCNC: NEGATIVE MG/DL
RBC SPEC QL WET PREP: NORMAL
SP GR UR STRIP.AUTO: 1.01 (ref 1–1.03)
SPECIMEN SOURCE FLD: NORMAL
T VAGINALIS GENITAL QL WET PREP: NORMAL
UA COMPLETE W REFLEX CULTURE PNL UR: NORMAL
UA DIPSTICK W REFLEX MICRO PNL UR: NORMAL
URN SPEC COLLECT METH UR: NORMAL
UROBILINOGEN UR STRIP-ACNC: 1 E.U./DL
WBC SPEC QL WET PREP: NORMAL
YEAST GENITAL QL WET PREP: NORMAL

## 2025-07-22 PROCEDURE — 87591 N.GONORRHOEAE DNA AMP PROB: CPT

## 2025-07-22 PROCEDURE — 87210 SMEAR WET MOUNT SALINE/INK: CPT

## 2025-07-22 PROCEDURE — 99284 EMERGENCY DEPT VISIT MOD MDM: CPT

## 2025-07-22 PROCEDURE — 36415 COLL VENOUS BLD VENIPUNCTURE: CPT

## 2025-07-22 PROCEDURE — 84702 CHORIONIC GONADOTROPIN TEST: CPT

## 2025-07-22 PROCEDURE — 87491 CHLMYD TRACH DNA AMP PROBE: CPT

## 2025-07-22 PROCEDURE — 84703 CHORIONIC GONADOTROPIN ASSAY: CPT

## 2025-07-22 PROCEDURE — 76817 TRANSVAGINAL US OBSTETRIC: CPT

## 2025-07-22 PROCEDURE — 81003 URINALYSIS AUTO W/O SCOPE: CPT

## 2025-07-22 ASSESSMENT — PAIN - FUNCTIONAL ASSESSMENT: PAIN_FUNCTIONAL_ASSESSMENT: 0-10

## 2025-07-22 ASSESSMENT — LIFESTYLE VARIABLES
HOW MANY STANDARD DRINKS CONTAINING ALCOHOL DO YOU HAVE ON A TYPICAL DAY: 1 OR 2
HOW OFTEN DO YOU HAVE A DRINK CONTAINING ALCOHOL: MONTHLY OR LESS

## 2025-07-22 ASSESSMENT — PAIN SCALES - GENERAL: PAINLEVEL_OUTOF10: 0

## 2025-07-22 NOTE — ED NOTES
Report given to [facility] RN to notify of patients arrival. Pt notified to go straight to [facility], no stops, nothing to eat or drink on the way. Pt verbalized understanding.   Pt ambulatory with steady gait thru lobby doors.

## 2025-07-22 NOTE — ED PROVIDER NOTES
[facility] EMERGENCY DEPARTMENT    CHIEF COMPLAINT  Abdominal Pain (Lower abd cramping for past few days with constipation and nausea; denies urinary symptoms)       HISTORY OF PRESENT ILLNESS  Genesis Carter is a 30 y.o. female who presents to the ED complaining of abdominal pain. Symptoms started three days ago. The pain is suprapubic, sharp/aching, no exacerbating or ameliorating factors, comes and goes. Denies dysuria, hematuria, vaginal bleeding or discharge. Denies fever, chest pain. Had some SOB two days ago but none now. Nausea but no emesis or diarrhea. Last BM two days ago, needs to strain to pass stool. No blood in stool. Seen in this ED 7/18/2025 concerned that she might be pregnant - preg test was negative.     I have reviewed the following from the nursing documentation:    Past Medical History:   Diagnosis Date    Acquired hypothyroidism      Past Surgical History:   Procedure Laterality Date    APPENDECTOMY      TOTAL THYROIDECTOMY       Family History   Problem Relation Age of Onset    Cancer Maternal Grandmother      Social History     Socioeconomic History    Marital status: Single     Spouse name: Not on file    Number of children: Not on file    Years of education: Not on file    Highest education level: Not on file   Occupational History    Not on file   Tobacco Use    Smoking status: Never    Smokeless tobacco: Never   Substance and Sexual Activity    Alcohol use: No    Drug use: No    Sexual activity: Yes     Partners: Male   Other Topics Concern    Not on file   Social History Narrative    Not on file     Social Drivers of Health     Financial Resource Strain: Low Risk  (10/14/2024)    Overall Financial Resource Strain (CARDIA)     Difficulty of Paying Living Expenses: Not hard at all   Food Insecurity: Not At Risk (11/10/2024)    Received from [employer] and [employer] [facility]    Food Insecurities     Within the past 12 months, did you worry that your food would run out before you

## 2025-07-22 NOTE — DISCHARGE INSTRUCTIONS
Call on tomorrow to schedule an appointment for evaluation by  OB/GYN.      Return to the emergency department or worsening symptoms including, limited to, developing worsening pain, vaginal bleeding or discharge, inability tolerate food or drink, fever, chills, sweats, other symptoms/concerns.      Follow-up in 48 hours for repeat hCG levels.        Follow-up with  OB/GYN department to establish prenatal care.

## 2025-07-23 LAB
C TRACH DNA CVX QL NAA+PROBE: NEGATIVE
N GONORRHOEA DNA CERV MUCUS QL NAA+PROBE: NEGATIVE

## 2025-07-23 NOTE — ED PROVIDER NOTES
[facility] EMERGENCY DEPARTMENT  EMERGENCY DEPARTMENT ENCOUNTER        Pt Name: Genesis Carter  MRN: [id number]  Birthdate 1995  Date of evaluation: 2025  Provider: MARIYA Mejia CNP  PCP: No primary care provider on file.  Note Started: 12:19 AM EDT 25      FAUSTINO. I have evaluated this patient.        CHIEF COMPLAINT       Chief Complaint   Patient presents with    Abdominal Pain     Lower abd cramping for past few days with constipation and nausea; denies urinary symptoms       HISTORY OF PRESENT ILLNESS: 1 or more Elements     History From: Patient  Limitations to history : None    Genesis Carter is a 30 y.o. nontoxic, well-appearing G5, P2  L2 female who presents to [facility] from [facility] for abdominal pain and pregnancy.  She endorses a 3-day history of \"sharp and stabbing\" lower abdominal pain.  Accompanying symptoms include nausea and without diarrhea.  Denies fever, chills, sweats, urinary frequency, urgency, dysuria, retention, rashes, open sores on genitalia, concern for STIs, other symptoms/concerns.    Nursing Notes were all reviewed and agreed with or any disagreements were addressed in the HPI.    REVIEW OF SYSTEMS :      Review of Systems   Constitutional:  Negative for chills, diaphoresis, fatigue and fever.   HENT:  Negative for congestion and sore throat.    Eyes:  Negative for pain and visual disturbance.   Respiratory:  Negative for cough and shortness of breath.    Cardiovascular:  Negative for chest pain and leg swelling.   Gastrointestinal:  Positive for abdominal pain and nausea. Negative for anal bleeding, diarrhea (Resolved diarrhea) and vomiting.   Genitourinary:  Negative for difficulty urinating, dysuria, frequency, urgency, vaginal bleeding and vaginal discharge.   Musculoskeletal:  Negative for back pain, neck pain and neck stiffness.   Skin:  Negative for rash and wound.   Neurological:  Negative for dizziness and light-headedness.

## 2025-07-25 ASSESSMENT — ENCOUNTER SYMPTOMS
EYE PAIN: 0
SORE THROAT: 0
DIARRHEA: 0
BACK PAIN: 0
SHORTNESS OF BREATH: 0
ANAL BLEEDING: 0
NAUSEA: 1
COUGH: 0
ABDOMINAL PAIN: 1
VOMITING: 0

## 2025-09-03 ENCOUNTER — HOSPITAL ENCOUNTER (EMERGENCY)
Age: 30
Discharge: HOME | End: 2025-09-03
Attending: EMERGENCY MEDICINE
Payer: COMMERCIAL

## 2025-09-03 VITALS
HEART RATE: 91 BPM | BODY MASS INDEX: 25.76 KG/M2 | SYSTOLIC BLOOD PRESSURE: 108 MMHG | OXYGEN SATURATION: 99 % | WEIGHT: 140 LBS | DIASTOLIC BLOOD PRESSURE: 73 MMHG | HEIGHT: 62 IN | RESPIRATION RATE: 16 BRPM | TEMPERATURE: 98 F

## 2025-09-03 DIAGNOSIS — K20.90 ESOPHAGITIS, ACUTE: Primary | ICD-10-CM

## 2025-09-03 DIAGNOSIS — T78.40XA ALLERGIC REACTION, INITIAL ENCOUNTER: ICD-10-CM

## 2025-09-03 PROCEDURE — 99283 EMERGENCY DEPT VISIT LOW MDM: CPT

## 2025-09-03 PROCEDURE — 6370000000 HC RX 637 (ALT 250 FOR IP): Performed by: EMERGENCY MEDICINE

## 2025-09-03 RX ORDER — PREDNISONE 20 MG/1
40 TABLET ORAL ONCE
Status: COMPLETED | OUTPATIENT
Start: 2025-09-03 | End: 2025-09-03

## 2025-09-03 RX ORDER — FAMOTIDINE 20 MG/1
20 TABLET, FILM COATED ORAL 2 TIMES DAILY
Qty: 10 TABLET | Refills: 3 | Status: SHIPPED | OUTPATIENT
Start: 2025-09-03

## 2025-09-03 RX ORDER — DIPHENHYDRAMINE HCL 25 MG
25 CAPSULE ORAL EVERY 6 HOURS PRN
Qty: 20 CAPSULE | Refills: 0 | Status: SHIPPED | OUTPATIENT
Start: 2025-09-03 | End: 2025-09-03

## 2025-09-03 RX ORDER — PREDNISONE 20 MG/1
40 TABLET ORAL DAILY
Qty: 8 TABLET | Refills: 0 | Status: SHIPPED | OUTPATIENT
Start: 2025-09-03 | End: 2025-09-07

## 2025-09-03 RX ORDER — DIPHENHYDRAMINE HCL 25 MG
25 CAPSULE ORAL EVERY 6 HOURS PRN
Qty: 20 CAPSULE | Refills: 0 | Status: SHIPPED | OUTPATIENT
Start: 2025-09-03 | End: 2025-09-08

## 2025-09-03 RX ORDER — FAMOTIDINE 20 MG/1
20 TABLET, FILM COATED ORAL ONCE
Status: COMPLETED | OUTPATIENT
Start: 2025-09-03 | End: 2025-09-03

## 2025-09-03 RX ADMIN — PREDNISONE 40 MG: 20 TABLET ORAL at 03:10

## 2025-09-03 RX ADMIN — FAMOTIDINE 20 MG: 20 TABLET, FILM COATED ORAL at 03:10

## 2025-09-03 ASSESSMENT — PAIN - FUNCTIONAL ASSESSMENT: PAIN_FUNCTIONAL_ASSESSMENT: 0-10

## 2025-09-03 ASSESSMENT — PAIN SCALES - GENERAL: PAINLEVEL_OUTOF10: 0
